# Patient Record
Sex: FEMALE | Race: WHITE | Employment: FULL TIME | URBAN - NONMETROPOLITAN AREA
[De-identification: names, ages, dates, MRNs, and addresses within clinical notes are randomized per-mention and may not be internally consistent; named-entity substitution may affect disease eponyms.]

---

## 2017-10-05 ENCOUNTER — HOSPITAL ENCOUNTER (OUTPATIENT)
Dept: WOMENS IMAGING | Age: 47
Discharge: HOME | End: 2017-10-05
Payer: COMMERCIAL

## 2017-10-05 DIAGNOSIS — Z12.31 VISIT FOR SCREENING MAMMOGRAM: ICD-10-CM

## 2017-10-05 PROCEDURE — 77063 BREAST TOMOSYNTHESIS BI: CPT

## 2017-11-10 ENCOUNTER — HOSPITAL ENCOUNTER (OUTPATIENT)
Dept: GENERAL RADIOLOGY | Age: 47
Discharge: HOME | End: 2017-11-10
Payer: COMMERCIAL

## 2017-11-10 ENCOUNTER — HOSPITAL ENCOUNTER (OUTPATIENT)
Age: 47
Discharge: HOME | End: 2017-11-10
Payer: COMMERCIAL

## 2017-11-10 DIAGNOSIS — M54.5 LOW BACK PAIN, UNSPECIFIED BACK PAIN LATERALITY, UNSPECIFIED CHRONICITY, WITH SCIATICA PRESENCE UNSPECIFIED: ICD-10-CM

## 2017-11-10 PROCEDURE — 72100 X-RAY EXAM L-S SPINE 2/3 VWS: CPT

## 2018-10-18 ENCOUNTER — HOSPITAL ENCOUNTER (OUTPATIENT)
Dept: WOMENS IMAGING | Age: 48
Discharge: HOME | End: 2018-10-18
Payer: COMMERCIAL

## 2018-10-18 DIAGNOSIS — Z12.31 VISIT FOR SCREENING MAMMOGRAM: ICD-10-CM

## 2018-10-18 PROCEDURE — 77063 BREAST TOMOSYNTHESIS BI: CPT

## 2019-11-22 ENCOUNTER — HOSPITAL ENCOUNTER (OUTPATIENT)
Dept: WOMENS IMAGING | Age: 49
Discharge: HOME | End: 2019-11-22
Payer: COMMERCIAL

## 2019-11-22 DIAGNOSIS — Z12.31 VISIT FOR SCREENING MAMMOGRAM: ICD-10-CM

## 2019-11-22 PROCEDURE — 77063 BREAST TOMOSYNTHESIS BI: CPT

## 2020-09-16 ENCOUNTER — HOSPITAL ENCOUNTER (OUTPATIENT)
Age: 50
Discharge: HOME | End: 2020-09-16
Payer: OTHER GOVERNMENT

## 2020-09-16 PROCEDURE — 99211 OFF/OP EST MAY X REQ PHY/QHP: CPT

## 2020-09-16 PROCEDURE — U0003 INFECTIOUS AGENT DETECTION BY NUCLEIC ACID (DNA OR RNA); SEVERE ACUTE RESPIRATORY SYNDROME CORONAVIRUS 2 (SARS-COV-2) (CORONAVIRUS DISEASE [COVID-19]), AMPLIFIED PROBE TECHNIQUE, MAKING USE OF HIGH THROUGHPUT TECHNOLOGIES AS DESCRIBED BY CMS-2020-01-R: HCPCS

## 2020-09-16 NOTE — PROGRESS NOTES
Patient walked to room 1, COVID swab collected and sent to the lab, patient tolerated well and left in stable condition.

## 2020-09-18 LAB — SARS-COV-2: NOT DETECTED

## 2020-12-09 ENCOUNTER — HOSPITAL ENCOUNTER (OUTPATIENT)
Dept: WOMENS IMAGING | Age: 50
Discharge: HOME | End: 2020-12-09
Payer: COMMERCIAL

## 2020-12-09 PROCEDURE — 77063 BREAST TOMOSYNTHESIS BI: CPT

## 2021-12-27 ENCOUNTER — HOSPITAL ENCOUNTER (OUTPATIENT)
Dept: WOMENS IMAGING | Age: 51
Discharge: HOME | End: 2021-12-27
Payer: COMMERCIAL

## 2021-12-27 DIAGNOSIS — Z12.31 VISIT FOR SCREENING MAMMOGRAM: ICD-10-CM

## 2021-12-27 PROCEDURE — 77063 BREAST TOMOSYNTHESIS BI: CPT

## 2023-01-06 ENCOUNTER — HOSPITAL ENCOUNTER (OUTPATIENT)
Dept: WOMENS IMAGING | Age: 53
Discharge: HOME | End: 2023-01-06
Payer: COMMERCIAL

## 2023-01-06 DIAGNOSIS — Z12.31 VISIT FOR SCREENING MAMMOGRAM: ICD-10-CM

## 2023-01-06 PROCEDURE — 77067 SCR MAMMO BI INCL CAD: CPT

## 2023-02-20 ENCOUNTER — TELEPHONE (OUTPATIENT)
Dept: FAMILY MEDICINE CLINIC | Age: 53
End: 2023-02-20

## 2023-02-20 NOTE — TELEPHONE ENCOUNTER
----- Message from Armin Boone sent at 2/20/2023 10:00 AM EST -----  Subject: Appointment Request    Reason for Call: New Patient/New to Provider Appointment needed: New   Patient Request Appointment    QUESTIONS    Reason for appointment request? No appointments available during search     Additional Information for Provider? Pt is looking to establish care with   Dr. Reji Chatterjee. He was recommended by a friend that just started to see   him as well. She will need some refills on her medicines through March   that are for DM. She would be available first thing in the morning or last   appt of the day she is flexible for scheduling.  Please call pt to advise.  ---------------------------------------------------------------------------  --------------  Noman BUSH  [phone]; OK to leave message on voicemail  ---------------------------------------------------------------------------  --------------  SCRIPT ANSWERS  COVID Screen: Irene Landaverde

## 2023-02-20 NOTE — TELEPHONE ENCOUNTER
Spoke with Aicha Tristan, NP appointment scheduled for 3/7/23. Pt to bring 2 years of records to her visit. Aicha Tirstan worked for Dr. Liliya Degroot he was not happy that she left. That office probably won't respond for any additional records.

## 2023-03-07 ENCOUNTER — OFFICE VISIT (OUTPATIENT)
Dept: FAMILY MEDICINE CLINIC | Age: 53
End: 2023-03-07

## 2023-03-07 VITALS
BODY MASS INDEX: 31.81 KG/M2 | HEART RATE: 84 BPM | SYSTOLIC BLOOD PRESSURE: 126 MMHG | OXYGEN SATURATION: 98 % | HEIGHT: 66 IN | WEIGHT: 197.9 LBS | RESPIRATION RATE: 16 BRPM | DIASTOLIC BLOOD PRESSURE: 82 MMHG

## 2023-03-07 DIAGNOSIS — K58.0 IRRITABLE BOWEL SYNDROME WITH DIARRHEA: ICD-10-CM

## 2023-03-07 DIAGNOSIS — E08.9 DIABETES MELLITUS DUE TO UNDERLYING CONDITION, CONTROLLED, WITHOUT COMPLICATION, WITHOUT LONG-TERM CURRENT USE OF INSULIN (HCC): ICD-10-CM

## 2023-03-07 DIAGNOSIS — L72.3 SEBACEOUS CYST: ICD-10-CM

## 2023-03-07 DIAGNOSIS — E66.9 OBESITY (BMI 30.0-34.9): ICD-10-CM

## 2023-03-07 DIAGNOSIS — Z00.00 WELL ADULT HEALTH CHECK: Primary | ICD-10-CM

## 2023-03-07 DIAGNOSIS — E78.00 PURE HYPERCHOLESTEROLEMIA: ICD-10-CM

## 2023-03-07 RX ORDER — ROSUVASTATIN CALCIUM 10 MG/1
TABLET, COATED ORAL
COMMUNITY
Start: 2023-02-27

## 2023-03-07 RX ORDER — METFORMIN HYDROCHLORIDE 500 MG/1
TABLET, EXTENDED RELEASE ORAL
COMMUNITY
Start: 2023-02-27

## 2023-03-07 SDOH — ECONOMIC STABILITY: FOOD INSECURITY: WITHIN THE PAST 12 MONTHS, THE FOOD YOU BOUGHT JUST DIDN'T LAST AND YOU DIDN'T HAVE MONEY TO GET MORE.: NEVER TRUE

## 2023-03-07 SDOH — ECONOMIC STABILITY: FOOD INSECURITY: WITHIN THE PAST 12 MONTHS, YOU WORRIED THAT YOUR FOOD WOULD RUN OUT BEFORE YOU GOT MONEY TO BUY MORE.: NEVER TRUE

## 2023-03-07 SDOH — HEALTH STABILITY: PHYSICAL HEALTH: ON AVERAGE, HOW MANY MINUTES DO YOU ENGAGE IN EXERCISE AT THIS LEVEL?: 10 MIN

## 2023-03-07 SDOH — HEALTH STABILITY: PHYSICAL HEALTH: ON AVERAGE, HOW MANY DAYS PER WEEK DO YOU ENGAGE IN MODERATE TO STRENUOUS EXERCISE (LIKE A BRISK WALK)?: 1 DAY

## 2023-03-07 SDOH — ECONOMIC STABILITY: HOUSING INSECURITY
IN THE LAST 12 MONTHS, WAS THERE A TIME WHEN YOU DID NOT HAVE A STEADY PLACE TO SLEEP OR SLEPT IN A SHELTER (INCLUDING NOW)?: NO

## 2023-03-07 SDOH — ECONOMIC STABILITY: INCOME INSECURITY: HOW HARD IS IT FOR YOU TO PAY FOR THE VERY BASICS LIKE FOOD, HOUSING, MEDICAL CARE, AND HEATING?: NOT HARD AT ALL

## 2023-03-07 ASSESSMENT — PATIENT HEALTH QUESTIONNAIRE - PHQ9
SUM OF ALL RESPONSES TO PHQ9 QUESTIONS 1 & 2: 0
1. LITTLE INTEREST OR PLEASURE IN DOING THINGS: 0
SUM OF ALL RESPONSES TO PHQ QUESTIONS 1-9: 0
SUM OF ALL RESPONSES TO PHQ QUESTIONS 1-9: 0
2. FEELING DOWN, DEPRESSED OR HOPELESS: 0
SUM OF ALL RESPONSES TO PHQ QUESTIONS 1-9: 0
SUM OF ALL RESPONSES TO PHQ QUESTIONS 1-9: 0

## 2023-03-07 ASSESSMENT — SOCIAL DETERMINANTS OF HEALTH (SDOH)
WITHIN THE LAST YEAR, HAVE YOU BEEN AFRAID OF YOUR PARTNER OR EX-PARTNER?: NO
WITHIN THE LAST YEAR, HAVE YOU BEEN KICKED, HIT, SLAPPED, OR OTHERWISE PHYSICALLY HURT BY YOUR PARTNER OR EX-PARTNER?: NO
WITHIN THE LAST YEAR, HAVE YOU BEEN HUMILIATED OR EMOTIONALLY ABUSED IN OTHER WAYS BY YOUR PARTNER OR EX-PARTNER?: NO
WITHIN THE LAST YEAR, HAVE TO BEEN RAPED OR FORCED TO HAVE ANY KIND OF SEXUAL ACTIVITY BY YOUR PARTNER OR EX-PARTNER?: NO

## 2023-03-07 NOTE — PROGRESS NOTES
3/7/2023    Silvana Leon (:  1970) is a 47 y.o. female, here for a preventive medicine evaluation. Chief Complaint   Patient presents with    New Patient     Establish care, previously saw Dr. Duy Ruvalcaba, Medication Refills     NP to establish. Last PCP Dr. Duy Ruvalcaba. Hx of DM2, on Jardiance and metformin. Last A1C 6.3.    BPs fine. BP Readings from Last 3 Encounters:   23 126/82   16 117/68     Wt Readings from Last 3 Encounters:   23 197 lb 14.4 oz (89.8 kg)   16 210 lb 6.4 oz (95.4 kg)     Hx of IBS-D, controlled on Viberzi. Sees Dr. Audrey Read. CRS UTD. Pt has a cyst to upper chest that has been there for years. Getting larger. Would like removed. Patient Active Problem List   Diagnosis    Diabetes mellitus due to underlying condition, controlled, without complication, without long-term current use of insulin (HCC)    Pure hypercholesterolemia    Irritable bowel syndrome with diarrhea    Obesity (BMI 30.0-34. 9)     Past Surgical History:   Procedure Laterality Date    COLONOSCOPY       Social History     Tobacco Use    Smoking status: Never    Smokeless tobacco: Never   Substance Use Topics    Alcohol use: No     Alcohol/week: 0.0 standard drinks    Drug use: No       Review of Systems   Constitutional: Negative. HENT: Negative. Respiratory: Negative. Cardiovascular: Negative. Gastrointestinal: Negative. Musculoskeletal: Negative. Skin:         Cyst to chest   All other systems reviewed and are negative. Prior to Visit Medications    Medication Sig Taking?  Authorizing Provider   Cetirizine HCl 10 MG CAPS Take 1 tablet by mouth daily as needed Yes Historical Provider, MD   metFORMIN (GLUCOPHAGE-XR) 500 MG extended release tablet TAKE 1 TABLET BY MOUTH WITH SUPPER Yes Historical Provider, MD   rosuvastatin (CRESTOR) 10 MG tablet TAKE 1 TABLET BY MOUTH EVERY DAY Yes Historical Provider, MD   empagliflozin (JARDIANCE) 10 MG tablet Take 1 tablet by mouth daily Yes Latonya Hernandez DO   VIBERZI 75 MG TABS Take 75 mg by mouth daily. Yes Historical Provider, MD   Multiple Vitamins-Minerals (WOMENS MULTI VITAMIN & MINERAL PO) Take by mouth daily Yes Historical Provider, MD   acetaminophen (TYLENOL) 325 MG tablet Take 650 mg by mouth every 6 hours as needed for Pain Yes Historical Provider, MD        No Known Allergies    Past Medical History:   Diagnosis Date    Headache     Irritable bowel syndrome        Past Surgical History:   Procedure Laterality Date    COLONOSCOPY         Social History     Socioeconomic History    Marital status:      Spouse name: Not on file    Number of children: Not on file    Years of education: Not on file    Highest education level: Not on file   Occupational History    Not on file   Tobacco Use    Smoking status: Never    Smokeless tobacco: Never   Substance and Sexual Activity    Alcohol use: No     Alcohol/week: 0.0 standard drinks    Drug use: No    Sexual activity: Not on file   Other Topics Concern    Not on file   Social History Narrative    Not on file     Social Determinants of Health     Financial Resource Strain: Low Risk     Difficulty of Paying Living Expenses: Not hard at all   Food Insecurity: No Food Insecurity    Worried About Running Out of Food in the Last Year: Never true    Ran Out of Food in the Last Year: Never true   Transportation Needs: Unknown    Lack of Transportation (Medical): Not on file    Lack of Transportation (Non-Medical):  No   Physical Activity: Insufficiently Active    Days of Exercise per Week: 1 day    Minutes of Exercise per Session: 10 min   Stress: Not on file   Social Connections: Not on file   Intimate Partner Violence: Not At Risk    Fear of Current or Ex-Partner: No    Emotionally Abused: No    Physically Abused: No    Sexually Abused: No   Housing Stability: Unknown    Unable to Pay for Housing in the Last Year: Not on file    Number of Places Lived in the Last Year: Not on file    Unstable Housing in the Last Year: No        Family History   Problem Relation Age of Onset    High Blood Pressure Mother     Diabetes Mother     High Blood Pressure Father     High Cholesterol Father     High Blood Pressure Sister     Cancer Maternal Grandmother         Lung    High Blood Pressure Maternal Grandmother     Stroke Maternal Grandfather     High Blood Pressure Maternal Grandfather     Heart Disease Paternal Grandmother     High Blood Pressure Paternal Grandmother     Heart Disease Paternal Grandfather     High Blood Pressure Paternal Grandfather     High Blood Pressure Sister        ADVANCE DIRECTIVE: N, <no information>    Vitals:    03/07/23 1515   BP: 126/82   Site: Left Upper Arm   Position: Sitting   Cuff Size: Large Adult   Pulse: 84   Resp: 16   SpO2: 98%  Comment: Room Air   Weight: 197 lb 14.4 oz (89.8 kg)   Height: 5' 5.95\" (1.675 m)     Estimated body mass index is 32 kg/m² as calculated from the following:    Height as of this encounter: 5' 5.95\" (1.675 m). Weight as of this encounter: 197 lb 14.4 oz (89.8 kg). Physical Exam  Vitals and nursing note reviewed. Constitutional:       General: She is not in acute distress. Appearance: Normal appearance. She is well-developed. HENT:      Head: Normocephalic and atraumatic. Right Ear: Tympanic membrane normal.      Left Ear: Tympanic membrane normal.   Eyes:      Conjunctiva/sclera: Conjunctivae normal.   Cardiovascular:      Rate and Rhythm: Normal rate and regular rhythm. Heart sounds: Normal heart sounds. No murmur heard. Pulmonary:      Effort: Pulmonary effort is normal.      Breath sounds: Normal breath sounds. No wheezing, rhonchi or rales. Chest:       Abdominal:      General: There is no distension. Musculoskeletal:      Cervical back: Neck supple. Skin:     General: Skin is warm and dry. Findings: No rash (on exposed surfaces). Neurological:      General: No focal deficit present. Mental Status: She is alert. Psychiatric:         Attention and Perception: Attention normal.         Mood and Affect: Mood normal.         Speech: Speech normal.         Behavior: Behavior normal. Behavior is cooperative. Thought Content: Thought content normal.         Judgment: Judgment normal.       No flowsheet data found. No results found for: CHOL, CHOLFAST, TRIG, TRIGLYCFAST, HDL, LDLCHOLESTEROL, LDLCALC, GLUF, GLUCOSE, LABA1C    The ASCVD Risk score (Delfina DK, et al., 2019) failed to calculate for the following reasons:    Cannot find a previous HDL lab    Cannot find a previous total cholesterol lab      There is no immunization history on file for this patient. Health Maintenance   Topic Date Due    Pneumococcal 0-64 years Vaccine (1 - PCV) Never done    HIV screen  Never done    Hepatitis C screen  Never done    DTaP/Tdap/Td vaccine (1 - Tdap) Never done    Diabetes screen  Never done    COVID-19 Vaccine (2 - Booster for Santiago series) 05/07/2021    Shingles vaccine (1 of 2) Never done    Flu vaccine (1) 08/01/2022    Lipids  07/27/2023    Depression Screen  03/07/2024    Cervical cancer screen  12/22/2024    Breast cancer screen  01/06/2025    Colorectal Cancer Screen  01/13/2026    Hepatitis A vaccine  Aged Out    Hib vaccine  Aged Out    Meningococcal (ACWY) vaccine  Aged Out       Assessment & Plan   Well adult health check  -     Lipid Panel w/ Reflex Direct LDL; Future  -     Comprehensive Metabolic Panel; Future  -     Hemoglobin A1C; Future  -     Microalbumin / Creatinine Urine Ratio; Future  -     TSH with Reflex; Future  -     CBC with Auto Differential; Future  Diabetes mellitus due to underlying condition, controlled, without complication, without long-term current use of insulin (HCC)  -     empagliflozin (JARDIANCE) 10 MG tablet;  Take 1 tablet by mouth daily, Disp-90 tablet, R-3Normal  Pure hypercholesterolemia  Sebaceous cyst  Nasreen Parker MD, Plastic Surgery, [address]  Irritable bowel syndrome with diarrhea  Obesity (BMI 30.0-34.9)    -  PMHx reviewed  -  Chronic medical problems stable  -  Continue current medications  -  Refer to Plastic Sx for cyst removal  -  Recheck labs 6 mos    Return in about 6 months (around 9/7/2023) for DM2.         --Angela Tellez, DO

## 2023-03-08 PROBLEM — E78.00 PURE HYPERCHOLESTEROLEMIA: Status: ACTIVE | Noted: 2023-03-08

## 2023-03-08 PROBLEM — E66.811 OBESITY (BMI 30.0-34.9): Status: ACTIVE | Noted: 2023-03-08

## 2023-03-08 PROBLEM — K58.0 IRRITABLE BOWEL SYNDROME WITH DIARRHEA: Status: ACTIVE | Noted: 2023-03-08

## 2023-03-08 PROBLEM — E08.9 DIABETES MELLITUS DUE TO UNDERLYING CONDITION, CONTROLLED, WITHOUT COMPLICATION, WITHOUT LONG-TERM CURRENT USE OF INSULIN (HCC): Status: ACTIVE | Noted: 2023-03-08

## 2023-03-08 PROBLEM — E66.9 OBESITY (BMI 30.0-34.9): Status: ACTIVE | Noted: 2023-03-08

## 2023-03-08 ASSESSMENT — ENCOUNTER SYMPTOMS
GASTROINTESTINAL NEGATIVE: 1
RESPIRATORY NEGATIVE: 1

## 2023-03-27 DIAGNOSIS — E08.9 DIABETES MELLITUS DUE TO UNDERLYING CONDITION, CONTROLLED, WITHOUT COMPLICATION, WITHOUT LONG-TERM CURRENT USE OF INSULIN (HCC): ICD-10-CM

## 2023-03-27 NOTE — TELEPHONE ENCOUNTER
Fax received from [email or web address] stating that the patient has requested her Jardiance be refilled through them. Order pended for your signature.

## 2023-05-18 DIAGNOSIS — K58.0 IRRITABLE BOWEL SYNDROME WITH DIARRHEA: Primary | ICD-10-CM

## 2023-05-18 RX ORDER — ROSUVASTATIN CALCIUM 10 MG/1
10 TABLET, COATED ORAL DAILY
Qty: 90 TABLET | Refills: 3 | Status: SHIPPED | OUTPATIENT
Start: 2023-05-18

## 2023-05-18 RX ORDER — METFORMIN HYDROCHLORIDE 500 MG/1
TABLET, EXTENDED RELEASE ORAL
Qty: 90 TABLET | Refills: 3 | Status: SHIPPED | OUTPATIENT
Start: 2023-05-18

## 2023-05-18 RX ORDER — ELUXADOLINE 75 MG/1
75 TABLET, FILM COATED ORAL DAILY
Qty: 90 TABLET | Refills: 1 | Status: SHIPPED | OUTPATIENT
Start: 2023-05-18 | End: 2023-11-14

## 2023-07-25 ENCOUNTER — TELEPHONE (OUTPATIENT)
Dept: FAMILY MEDICINE CLINIC | Age: 53
End: 2023-07-25

## 2023-07-25 NOTE — TELEPHONE ENCOUNTER
Patient called and stated that she has on and off chest pain at night. She stated that her first \"episode\" was 4 months ago. She stated that she has had about 3 total. She stated that she will wake up in the night with chest pain, heart racing for about 10 mins and then it goes away. She also mention in the last week she has had right shoulder pain. She stated that she doesn't think it has anything to do with the chest pain or anything it feels like a pulled muscle. She stated that she gets pain in the shoulder and then it does down the arm and causes some numbness. She stated the shoulder pain is pretty constant, it doesn't come and go.      Patient wanted to know if we would order testing for the \"episodes\"

## 2023-08-01 ENCOUNTER — OFFICE VISIT (OUTPATIENT)
Dept: FAMILY MEDICINE CLINIC | Age: 53
End: 2023-08-01
Payer: COMMERCIAL

## 2023-08-01 VITALS
RESPIRATION RATE: 16 BRPM | HEART RATE: 76 BPM | BODY MASS INDEX: 31.45 KG/M2 | DIASTOLIC BLOOD PRESSURE: 80 MMHG | SYSTOLIC BLOOD PRESSURE: 134 MMHG | WEIGHT: 194.5 LBS

## 2023-08-01 DIAGNOSIS — E08.9 DIABETES MELLITUS DUE TO UNDERLYING CONDITION, CONTROLLED, WITHOUT COMPLICATION, WITHOUT LONG-TERM CURRENT USE OF INSULIN (HCC): ICD-10-CM

## 2023-08-01 DIAGNOSIS — R06.09 DOE (DYSPNEA ON EXERTION): ICD-10-CM

## 2023-08-01 DIAGNOSIS — R07.9 CHEST PAIN, UNSPECIFIED TYPE: Primary | ICD-10-CM

## 2023-08-01 DIAGNOSIS — E78.00 PURE HYPERCHOLESTEROLEMIA: ICD-10-CM

## 2023-08-01 DIAGNOSIS — E66.9 OBESITY (BMI 30.0-34.9): ICD-10-CM

## 2023-08-01 DIAGNOSIS — Z82.49 FAMILY HISTORY OF EARLY CAD: ICD-10-CM

## 2023-08-01 PROCEDURE — 99214 OFFICE O/P EST MOD 30 MIN: CPT | Performed by: FAMILY MEDICINE

## 2023-08-01 ASSESSMENT — PATIENT HEALTH QUESTIONNAIRE - PHQ9
SUM OF ALL RESPONSES TO PHQ QUESTIONS 1-9: 0
1. LITTLE INTEREST OR PLEASURE IN DOING THINGS: 0
SUM OF ALL RESPONSES TO PHQ QUESTIONS 1-9: 0
SUM OF ALL RESPONSES TO PHQ9 QUESTIONS 1 & 2: 0
2. FEELING DOWN, DEPRESSED OR HOPELESS: 0
SUM OF ALL RESPONSES TO PHQ QUESTIONS 1-9: 0
SUM OF ALL RESPONSES TO PHQ QUESTIONS 1-9: 0

## 2023-08-01 ASSESSMENT — ENCOUNTER SYMPTOMS
SHORTNESS OF BREATH: 1
GASTROINTESTINAL NEGATIVE: 1
CHEST TIGHTNESS: 1

## 2023-08-01 NOTE — PROGRESS NOTES
daily      acetaminophen (TYLENOL) 325 MG tablet Take 2 tablets by mouth every 6 hours as needed for Pain       No current facility-administered medications for this visit. Past Surgical History:   Procedure Laterality Date    COLONOSCOPY         Review of Systems   Constitutional: Negative. HENT: Negative. Respiratory:  Positive for chest tightness and shortness of breath. Cardiovascular:  Positive for chest pain and palpitations. Gastrointestinal: Negative. Musculoskeletal:  Positive for arthralgias (right shoulder pain). Psychiatric/Behavioral:  Positive for sleep disturbance. The patient is nervous/anxious. All other systems reviewed and are negative. Objective   Physical Exam  Vitals and nursing note reviewed. Constitutional:       General: She is not in acute distress. Appearance: Normal appearance. She is well-developed. HENT:      Head: Normocephalic and atraumatic. Right Ear: Tympanic membrane normal.      Left Ear: Tympanic membrane normal.   Eyes:      Conjunctiva/sclera: Conjunctivae normal.   Cardiovascular:      Rate and Rhythm: Normal rate and regular rhythm. Heart sounds: Normal heart sounds. No murmur heard. Pulmonary:      Effort: Pulmonary effort is normal.      Breath sounds: Normal breath sounds. No wheezing, rhonchi or rales. Abdominal:      General: There is no distension. Musculoskeletal:      Cervical back: Neck supple. Skin:     General: Skin is warm and dry. Findings: No rash (on exposed surfaces). Neurological:      General: No focal deficit present. Mental Status: She is alert. Psychiatric:         Attention and Perception: Attention normal.         Mood and Affect: Mood normal.         Speech: Speech normal.         Behavior: Behavior normal. Behavior is cooperative. Thought Content: Thought content normal.         Judgment: Judgment normal.             ASSESSMENT/PLAN:  1.  Chest pain, unspecified type  -

## 2023-08-08 ENCOUNTER — HOSPITAL ENCOUNTER (OUTPATIENT)
Dept: NON INVASIVE DIAGNOSTICS | Age: 53
Discharge: HOME | End: 2023-08-08
Attending: FAMILY MEDICINE
Payer: COMMERCIAL

## 2023-08-08 DIAGNOSIS — R07.9 CHEST PAIN, UNSPECIFIED TYPE: ICD-10-CM

## 2023-08-08 DIAGNOSIS — R06.09 DOE (DYSPNEA ON EXERTION): ICD-10-CM

## 2023-08-08 LAB
LV EF: 60 %
LVEF MODALITY: NORMAL

## 2023-08-08 PROCEDURE — 93306 TTE W/DOPPLER COMPLETE: CPT

## 2023-11-27 DIAGNOSIS — K58.0 IRRITABLE BOWEL SYNDROME WITH DIARRHEA: ICD-10-CM

## 2023-11-27 RX ORDER — ELUXADOLINE 75 MG/1
75 TABLET, FILM COATED ORAL DAILY
Qty: 90 TABLET | Refills: 0 | Status: SHIPPED | OUTPATIENT
Start: 2023-11-27 | End: 2024-05-25

## 2024-01-10 ENCOUNTER — OFFICE VISIT (OUTPATIENT)
Dept: FAMILY MEDICINE CLINIC | Age: 54
End: 2024-01-10
Payer: COMMERCIAL

## 2024-01-10 VITALS
WEIGHT: 195.7 LBS | HEART RATE: 72 BPM | SYSTOLIC BLOOD PRESSURE: 124 MMHG | BODY MASS INDEX: 31.45 KG/M2 | HEIGHT: 66 IN | RESPIRATION RATE: 16 BRPM | DIASTOLIC BLOOD PRESSURE: 70 MMHG

## 2024-01-10 DIAGNOSIS — E08.9 DIABETES MELLITUS DUE TO UNDERLYING CONDITION, CONTROLLED, WITHOUT COMPLICATION, WITHOUT LONG-TERM CURRENT USE OF INSULIN (HCC): ICD-10-CM

## 2024-01-10 DIAGNOSIS — G47.00 INSOMNIA, UNSPECIFIED TYPE: ICD-10-CM

## 2024-01-10 DIAGNOSIS — E66.9 OBESITY (BMI 30.0-34.9): ICD-10-CM

## 2024-01-10 DIAGNOSIS — Z00.00 WELL ADULT HEALTH CHECK: Primary | ICD-10-CM

## 2024-01-10 DIAGNOSIS — K58.0 IRRITABLE BOWEL SYNDROME WITH DIARRHEA: ICD-10-CM

## 2024-01-10 DIAGNOSIS — E78.00 PURE HYPERCHOLESTEROLEMIA: ICD-10-CM

## 2024-01-10 PROCEDURE — 99396 PREV VISIT EST AGE 40-64: CPT | Performed by: FAMILY MEDICINE

## 2024-01-10 RX ORDER — ZOLPIDEM TARTRATE 6.25 MG/1
6.25 TABLET, FILM COATED, EXTENDED RELEASE ORAL NIGHTLY PRN
Qty: 30 TABLET | Refills: 2 | Status: SHIPPED | OUTPATIENT
Start: 2024-01-10 | End: 2024-04-09

## 2024-01-10 ASSESSMENT — PATIENT HEALTH QUESTIONNAIRE - PHQ9
1. LITTLE INTEREST OR PLEASURE IN DOING THINGS: 0
2. FEELING DOWN, DEPRESSED OR HOPELESS: 0
SUM OF ALL RESPONSES TO PHQ9 QUESTIONS 1 & 2: 0
SUM OF ALL RESPONSES TO PHQ QUESTIONS 1-9: 0

## 2024-01-10 NOTE — PROGRESS NOTES
Affect: Mood normal.         Speech: Speech normal.         Behavior: Behavior normal. Behavior is cooperative.         Thought Content: Thought content normal.         Judgment: Judgment normal.             Latest Ref Rng & Units 10/7/2023     6:30 AM 3/23/2022     1:05 PM 3/23/2022    10:35 AM   LAB PR[facility]   A1C 4.4 - 6.4 % 6.4      A1C POC 4.4 - 6.4 % 6.4      GLU random 70 - 110 mg/dL 132      GLU POC 70 - 126 mg/dL  129  170    CHOL <200 mg/dL 133      TRIG <150 mg/dL 207      HDL mg/dL 53      LDL CALC mg/dL 39       - 145 mEq/L 141      K 3.6 - 5.0 mEq/L 4.0      BUN 7 - 20 mg/dL 15      CR 0.60 - 1.30 mg/dL 0.65      CA 8.8 - 10.5 mg/dL 8.70      ALT 10 - 40 IU/L 18      AST 15 - 41 IU/L 14      TSH 0.490 - 4.670 mcIU/mL 3.837      HGB 12.0 - 15.0 gm/dL 14.9          Lab Results   Component Value Date/Time    CHOL 133 10/07/2023 06:30 AM    TRIG 207 10/07/2023 06:30 AM    HDL 53 10/07/2023 06:30 AM    LDLCALC 39 10/07/2023 06:30 AM    GLUCOSE 132 10/07/2023 06:30 AM    LABA1C 6.4 10/07/2023 06:30 AM       The 10-year ASCVD risk score (Delfina DASILVA, et al., 2019) is: 1.7%    Values used to calculate the score:      Age: 53 years      Sex: Female      Is Non- : No      Diabetic: Yes      Tobacco smoker: No      Systolic Blood Pressure: 124 mmHg      Is BP treated: No      HDL Cholesterol: 53 mg/dL      Total Cholesterol: 133 mg/dL      There is no immunization history on file for this patient.    Health Maintenance   Topic Date Due    Hepatitis B vaccine (1 of 3 - 3-dose series) Never done    COVID-19 Vaccine (1) Never done    Pneumococcal 0-64 years Vaccine (1 - PCV) Never done    Diabetic foot exam  Never done    HIV screen  Never done    Diabetic Alb to Cr ratio (uACR) test  Never done    Diabetic retinal exam  Never done    GFR test (Diabetes, CKD 3-4, OR last GFR 15-59)  Never done    DTaP/Tdap/Td vaccine (1 - Tdap) Never done    Shingles vaccine (1 of 2) Never done    Flu

## 2024-01-11 ASSESSMENT — ENCOUNTER SYMPTOMS
GASTROINTESTINAL NEGATIVE: 1
RESPIRATORY NEGATIVE: 1

## 2024-02-01 DIAGNOSIS — E08.9 DIABETES MELLITUS DUE TO UNDERLYING CONDITION, CONTROLLED, WITHOUT COMPLICATION, WITHOUT LONG-TERM CURRENT USE OF INSULIN (HCC): ICD-10-CM

## 2024-02-01 RX ORDER — EMPAGLIFLOZIN 10 MG/1
10 TABLET, FILM COATED ORAL DAILY
Qty: 90 TABLET | Refills: 3 | Status: SHIPPED | OUTPATIENT
Start: 2024-02-01

## 2024-02-06 ENCOUNTER — HOSPITAL ENCOUNTER (OUTPATIENT)
Dept: WOMENS IMAGING | Age: 54
Discharge: HOME | End: 2024-02-06
Payer: COMMERCIAL

## 2024-02-06 DIAGNOSIS — Z12.31 VISIT FOR SCREENING MAMMOGRAM: ICD-10-CM

## 2024-02-06 PROCEDURE — 77063 BREAST TOMOSYNTHESIS BI: CPT

## 2024-03-10 DIAGNOSIS — K58.0 IRRITABLE BOWEL SYNDROME WITH DIARRHEA: ICD-10-CM

## 2024-03-11 RX ORDER — ELUXADOLINE 75 MG/1
75 TABLET, FILM COATED ORAL DAILY
Qty: 90 TABLET | Refills: 0 | Status: SHIPPED | OUTPATIENT
Start: 2024-03-11 | End: 2024-09-07

## 2024-03-20 DIAGNOSIS — E08.9 DIABETES MELLITUS DUE TO UNDERLYING CONDITION, CONTROLLED, WITHOUT COMPLICATION, WITHOUT LONG-TERM CURRENT USE OF INSULIN (HCC): ICD-10-CM

## 2024-04-30 DIAGNOSIS — G47.00 INSOMNIA, UNSPECIFIED TYPE: ICD-10-CM

## 2024-04-30 RX ORDER — ZOLPIDEM TARTRATE 6.25 MG/1
6.25 TABLET, FILM COATED, EXTENDED RELEASE ORAL NIGHTLY PRN
Qty: 30 TABLET | Refills: 2 | Status: SHIPPED | OUTPATIENT
Start: 2024-04-30 | End: 2024-07-29

## 2024-05-28 RX ORDER — METFORMIN HYDROCHLORIDE 500 MG/1
TABLET, EXTENDED RELEASE ORAL
Qty: 90 TABLET | Refills: 3 | Status: SHIPPED | OUTPATIENT
Start: 2024-05-28

## 2024-05-28 RX ORDER — ROSUVASTATIN CALCIUM 10 MG/1
10 TABLET, COATED ORAL DAILY
Qty: 90 TABLET | Refills: 3 | Status: SHIPPED | OUTPATIENT
Start: 2024-05-28

## 2024-05-30 ENCOUNTER — OFFICE VISIT (OUTPATIENT)
Dept: FAMILY MEDICINE CLINIC | Age: 54
End: 2024-05-30
Payer: COMMERCIAL

## 2024-05-30 VITALS
WEIGHT: 192.8 LBS | RESPIRATION RATE: 16 BRPM | HEIGHT: 66 IN | HEART RATE: 80 BPM | BODY MASS INDEX: 30.98 KG/M2 | DIASTOLIC BLOOD PRESSURE: 70 MMHG | SYSTOLIC BLOOD PRESSURE: 122 MMHG

## 2024-05-30 DIAGNOSIS — R05.1 ACUTE COUGH: ICD-10-CM

## 2024-05-30 DIAGNOSIS — J32.9 SINOBRONCHITIS: Primary | ICD-10-CM

## 2024-05-30 DIAGNOSIS — J40 SINOBRONCHITIS: Primary | ICD-10-CM

## 2024-05-30 DIAGNOSIS — E08.9 DIABETES MELLITUS DUE TO UNDERLYING CONDITION, CONTROLLED, WITHOUT COMPLICATION, WITHOUT LONG-TERM CURRENT USE OF INSULIN (HCC): ICD-10-CM

## 2024-05-30 PROCEDURE — G2211 COMPLEX E/M VISIT ADD ON: HCPCS | Performed by: NURSE PRACTITIONER

## 2024-05-30 PROCEDURE — 99213 OFFICE O/P EST LOW 20 MIN: CPT | Performed by: NURSE PRACTITIONER

## 2024-05-30 RX ORDER — PREDNISONE 20 MG/1
20 TABLET ORAL 2 TIMES DAILY
Qty: 4 TABLET | Refills: 0 | Status: SHIPPED | OUTPATIENT
Start: 2024-05-30 | End: 2024-06-03

## 2024-05-30 RX ORDER — AMOXICILLIN AND CLAVULANATE POTASSIUM 875; 125 MG/1; MG/1
1 TABLET, FILM COATED ORAL 2 TIMES DAILY
Qty: 14 TABLET | Refills: 0 | Status: SHIPPED | OUTPATIENT
Start: 2024-05-30 | End: 2024-06-06

## 2024-05-30 SDOH — ECONOMIC STABILITY: FOOD INSECURITY: WITHIN THE PAST 12 MONTHS, YOU WORRIED THAT YOUR FOOD WOULD RUN OUT BEFORE YOU GOT MONEY TO BUY MORE.: NEVER TRUE

## 2024-05-30 SDOH — ECONOMIC STABILITY: FOOD INSECURITY: WITHIN THE PAST 12 MONTHS, THE FOOD YOU BOUGHT JUST DIDN'T LAST AND YOU DIDN'T HAVE MONEY TO GET MORE.: NEVER TRUE

## 2024-05-30 SDOH — ECONOMIC STABILITY: INCOME INSECURITY: HOW HARD IS IT FOR YOU TO PAY FOR THE VERY BASICS LIKE FOOD, HOUSING, MEDICAL CARE, AND HEATING?: NOT HARD AT ALL

## 2024-05-30 ASSESSMENT — ENCOUNTER SYMPTOMS
SINUS PAIN: 1
SHORTNESS OF BREATH: 0
WHEEZING: 1
ABDOMINAL PAIN: 0
COUGH: 1
SORE THROAT: 0
NAUSEA: 0
RHINORRHEA: 1
SINUS PRESSURE: 1

## 2024-05-30 NOTE — PROGRESS NOTES
Selina YOUSIF Leon (1970) 53 y.o. female here for evaluation of the following chief complaint(s):      HPI:  Chief Complaint   Patient presents with    Cough     X 1 week     Facial Pain     Sinus pressure     Headache       Onset of 1 week with sinus pressure, congestion and cough.   Eyes watering and matted.  Sinus HA.   PND.   Nasal blockage at night.   Cough with wheezing.  Denies SOB.      has similar symptoms    OTC: Vicks Severe Cold/FLU, Tussion DM    Does have underlying allergies    Vitals:    05/30/24 0849   BP: 122/70   Pulse: 80   Resp: 16       Hemoglobin A1C   Date Value Ref Range Status   10/07/2023 6.4 4.4 - 6.4 % Final       Patient Active Problem List   Diagnosis    Diabetes mellitus due to underlying condition, controlled, without complication, without long-term current use of insulin (HCC)    Pure hypercholesterolemia    Irritable bowel syndrome with diarrhea    Obesity (BMI 30.0-34.9)       SUBJECTIVE/OBJECTIVE:  Review of Systems   Constitutional:  Positive for activity change, appetite change and fatigue. Negative for chills and fever.   HENT:  Positive for congestion, postnasal drip, rhinorrhea, sinus pressure and sinus pain. Negative for sore throat.    Respiratory:  Positive for cough and wheezing. Negative for shortness of breath.    Cardiovascular:  Negative for chest pain.   Gastrointestinal:  Negative for abdominal pain and nausea.   Skin:  Negative for rash.   Neurological:  Positive for headaches. Negative for dizziness and light-headedness.   Psychiatric/Behavioral: Negative.         Physical Exam  Constitutional:       General: She is not in acute distress.  HENT:      Nose: Mucosal edema, congestion and rhinorrhea present.      Right Sinus: Maxillary sinus tenderness present.      Left Sinus: Maxillary sinus tenderness present.   Eyes:      Pupils: Pupils are equal, round, and reactive to light.   Cardiovascular:      Rate and Rhythm: Normal rate and regular rhythm.

## 2024-06-13 ENCOUNTER — OFFICE VISIT (OUTPATIENT)
Dept: FAMILY MEDICINE CLINIC | Age: 54
End: 2024-06-13

## 2024-06-13 VITALS
WEIGHT: 192.3 LBS | SYSTOLIC BLOOD PRESSURE: 114 MMHG | HEIGHT: 66 IN | RESPIRATION RATE: 16 BRPM | DIASTOLIC BLOOD PRESSURE: 70 MMHG | BODY MASS INDEX: 30.91 KG/M2 | HEART RATE: 88 BPM

## 2024-06-13 DIAGNOSIS — R53.83 OTHER FATIGUE: ICD-10-CM

## 2024-06-13 DIAGNOSIS — R73.01 IFG (IMPAIRED FASTING GLUCOSE): ICD-10-CM

## 2024-06-13 DIAGNOSIS — K58.0 IRRITABLE BOWEL SYNDROME WITH DIARRHEA: ICD-10-CM

## 2024-06-13 DIAGNOSIS — N76.0 ACUTE VAGINITIS: Primary | ICD-10-CM

## 2024-06-13 RX ORDER — FLUCONAZOLE 150 MG/1
150 TABLET ORAL ONCE
Qty: 2 TABLET | Refills: 0 | Status: SHIPPED | OUTPATIENT
Start: 2024-06-13 | End: 2024-06-13

## 2024-06-13 ASSESSMENT — ENCOUNTER SYMPTOMS
ABDOMINAL PAIN: 0
COUGH: 0
NAUSEA: 1
ABDOMINAL DISTENTION: 1
DIARRHEA: 0
SHORTNESS OF BREATH: 0

## 2024-06-13 NOTE — PROGRESS NOTES
sounds: Normal breath sounds. No wheezing.   Abdominal:      General: Bowel sounds are normal. There is no distension.      Palpations: Abdomen is soft.      Tenderness: There is no abdominal tenderness.   Musculoskeletal:         General: No tenderness. Normal range of motion.      Cervical back: Normal range of motion and neck supple.   Skin:     General: Skin is warm and dry.      Findings: No rash.           ASSESSMENT/PLAN:   Diagnosis Orders   1. Acute vaginitis  fluconazole (DIFLUCAN) 150 MG tablet      2. IFG (impaired fasting glucose)        3. Irritable bowel syndrome with diarrhea        4. Other fatigue              MDM:  Diflucan x 2 doses  OTC Probiotic  Rest and Push Fluids  Reassurance on exam  RTO PRN    An electronic signature was used to authenticate this note.    --Ranjit Cummings, RAJI - CNP

## 2024-06-20 DIAGNOSIS — K58.0 IRRITABLE BOWEL SYNDROME WITH DIARRHEA: ICD-10-CM

## 2024-06-21 RX ORDER — ELUXADOLINE 75 MG/1
TABLET, FILM COATED ORAL
Qty: 90 TABLET | Refills: 1 | Status: SHIPPED | OUTPATIENT
Start: 2024-06-21 | End: 2024-12-18

## 2024-08-08 ENCOUNTER — PATIENT MESSAGE (OUTPATIENT)
Dept: FAMILY MEDICINE CLINIC | Age: 54
End: 2024-08-08

## 2024-08-08 DIAGNOSIS — G47.00 INSOMNIA, UNSPECIFIED TYPE: ICD-10-CM

## 2024-08-09 RX ORDER — ZOLPIDEM TARTRATE 6.25 MG/1
6.25 TABLET, FILM COATED, EXTENDED RELEASE ORAL NIGHTLY PRN
Qty: 30 TABLET | Refills: 2 | Status: SHIPPED | OUTPATIENT
Start: 2024-08-09 | End: 2024-11-07

## 2024-08-09 NOTE — TELEPHONE ENCOUNTER
From: Selina Leon  To: Dr. Merrill Larsen  Sent: 8/8/2024 11:20 PM EDT  Subject: Refill    I am in need of rx refill for my zolpidem please

## 2024-10-10 ENCOUNTER — OFFICE VISIT (OUTPATIENT)
Dept: FAMILY MEDICINE CLINIC | Age: 54
End: 2024-10-10

## 2024-10-10 VITALS
BODY MASS INDEX: 30.37 KG/M2 | DIASTOLIC BLOOD PRESSURE: 62 MMHG | SYSTOLIC BLOOD PRESSURE: 110 MMHG | TEMPERATURE: 98 F | RESPIRATION RATE: 12 BRPM | OXYGEN SATURATION: 98 % | WEIGHT: 189 LBS | HEART RATE: 61 BPM | HEIGHT: 66 IN

## 2024-10-10 DIAGNOSIS — E66.811 OBESITY (BMI 30.0-34.9): ICD-10-CM

## 2024-10-10 DIAGNOSIS — J30.2 SEASONAL ALLERGIC RHINITIS, UNSPECIFIED TRIGGER: ICD-10-CM

## 2024-10-10 DIAGNOSIS — E78.00 PURE HYPERCHOLESTEROLEMIA: ICD-10-CM

## 2024-10-10 DIAGNOSIS — D58.2 ELEVATED HEMOGLOBIN (HCC): ICD-10-CM

## 2024-10-10 DIAGNOSIS — K58.0 IRRITABLE BOWEL SYNDROME WITH DIARRHEA: ICD-10-CM

## 2024-10-10 DIAGNOSIS — E11.9 CONTROLLED TYPE 2 DIABETES MELLITUS WITHOUT COMPLICATION, WITHOUT LONG-TERM CURRENT USE OF INSULIN (HCC): Primary | ICD-10-CM

## 2024-10-10 RX ORDER — TIRZEPATIDE 2.5 MG/.5ML
2.5 INJECTION, SOLUTION SUBCUTANEOUS WEEKLY
Qty: 2 ML | Refills: 0 | Status: SHIPPED | OUTPATIENT
Start: 2024-10-10

## 2024-10-10 ASSESSMENT — ENCOUNTER SYMPTOMS
RHINORRHEA: 1
RESPIRATORY NEGATIVE: 1
EYE ITCHING: 1
GASTROINTESTINAL NEGATIVE: 1

## 2024-10-10 NOTE — PROGRESS NOTES
Selina Leon (:  1970) is a 53 y.o. female,Established patient, here for evaluation of the following chief complaint(s):  Labs Only (Discuss lab results, possible medication changes) and Sinusitis (Possible seasonal allergies)          Subjective   SUBJECTIVE/OBJECTIVE:  HPI  Chief Complaint   Patient presents with    Labs Only     Discuss lab results, possible medication changes    Sinusitis     Possible seasonal allergies     6 month eval.    Pt has been fighting some sinus pressure, congestion, cough, watery eyes for the last several weeks.  On Zyrtec.  Tried Nasacort but it gave her HAs.    A1C is up a little.  Lab Results   Component Value Date    LABA1C 6.8 (H) 10/05/2024    LABA1C 6.4 10/07/2023     Lab Results   Component Value Date    CREATININE 0.72 10/05/2024     BP Readings from Last 3 Encounters:   10/10/24 110/62   24 114/70   24 122/70     Weight is down.  Wt Readings from Last 3 Encounters:   10/10/24 85.7 kg (189 lb)   24 87.2 kg (192 lb 4.8 oz)   24 87.5 kg (192 lb 12.8 oz)       Patient Active Problem List   Diagnosis    Diabetes mellitus due to underlying condition, controlled, without complication, without long-term current use of insulin ([facility])    Pure hypercholesterolemia    Irritable bowel syndrome with diarrhea    Obesity (BMI 30.0-34.9)       Current Outpatient Medications   Medication Sig Dispense Refill    Tirzepatide (MOUNJARO) 2.5 MG/0.5ML SOPN SC injection Inject 0.5 mLs into the skin once a week 2 mL 0    zolpidem (AMBIEN CR) 6.25 MG extended release tablet Take 1 tablet by mouth nightly as needed for Sleep for up to 90 days. Max Daily Amount: 6.25 mg 30 tablet 2    VIBERZI 75 MG TABS TAKE 1 TABLET BY MOUTH DAILY - MAX DAILY AMOUNT: 75 MG 90 tablet 1    rosuvastatin (CRESTOR) 10 MG tablet TAKE 1 TABLET BY MOUTH EVERY DAY 90 tablet 3    metFORMIN (GLUCOPHAGE-XR) 500 MG extended release tablet TAKE 1 TABLET BY MOUTH WITH SUPPER 90 tablet 3     No

## 2024-11-06 ENCOUNTER — LAB (OUTPATIENT)
Dept: LAB | Age: 54
End: 2024-11-06

## 2024-11-12 DIAGNOSIS — E11.9 CONTROLLED TYPE 2 DIABETES MELLITUS WITHOUT COMPLICATION, WITHOUT LONG-TERM CURRENT USE OF INSULIN (HCC): ICD-10-CM

## 2024-11-12 RX ORDER — TIRZEPATIDE 5 MG/.5ML
5 INJECTION, SOLUTION SUBCUTANEOUS WEEKLY
Qty: 2 ML | Refills: 0 | Status: SHIPPED | OUTPATIENT
Start: 2024-11-12

## 2024-11-18 LAB — CYTOLOGY THIN PREP PAP: NORMAL

## 2024-11-25 ENCOUNTER — PATIENT MESSAGE (OUTPATIENT)
Dept: FAMILY MEDICINE CLINIC | Age: 54
End: 2024-11-25

## 2024-11-25 DIAGNOSIS — G47.00 INSOMNIA, UNSPECIFIED TYPE: Primary | ICD-10-CM

## 2024-11-25 RX ORDER — ZOLPIDEM TARTRATE 6.25 MG/1
6.25 TABLET, FILM COATED, EXTENDED RELEASE ORAL NIGHTLY PRN
Qty: 90 TABLET | Refills: 1 | Status: SHIPPED | OUTPATIENT
Start: 2024-11-25 | End: 2025-05-24

## 2024-11-25 RX ORDER — ZOLPIDEM TARTRATE 6.25 MG/1
6.25 TABLET, FILM COATED, EXTENDED RELEASE ORAL NIGHTLY PRN
COMMUNITY
End: 2024-11-25 | Stop reason: SDUPTHER

## 2024-12-12 DIAGNOSIS — E11.9 CONTROLLED TYPE 2 DIABETES MELLITUS WITHOUT COMPLICATION, WITHOUT LONG-TERM CURRENT USE OF INSULIN (HCC): ICD-10-CM

## 2024-12-13 LAB
ESTIMATED AVERAGE GLUCOSE: NORMAL
HBA1C MFR BLD: 6.2 %

## 2024-12-13 RX ORDER — TIRZEPATIDE 5 MG/.5ML
INJECTION, SOLUTION SUBCUTANEOUS
Qty: 2 ML | Refills: 2 | Status: SHIPPED | OUTPATIENT
Start: 2024-12-13

## 2024-12-13 RX ORDER — TIRZEPATIDE 5 MG/.5ML
5 INJECTION, SOLUTION SUBCUTANEOUS WEEKLY
Qty: 2 ML | Refills: 0 | OUTPATIENT
Start: 2024-12-13

## 2025-01-07 ASSESSMENT — PATIENT HEALTH QUESTIONNAIRE - PHQ9
2. FEELING DOWN, DEPRESSED OR HOPELESS: NOT AT ALL
SUM OF ALL RESPONSES TO PHQ QUESTIONS 1-9: 0
SUM OF ALL RESPONSES TO PHQ QUESTIONS 1-9: 0
SUM OF ALL RESPONSES TO PHQ9 QUESTIONS 1 & 2: 0
SUM OF ALL RESPONSES TO PHQ QUESTIONS 1-9: 0
1. LITTLE INTEREST OR PLEASURE IN DOING THINGS: NOT AT ALL
1. LITTLE INTEREST OR PLEASURE IN DOING THINGS: NOT AT ALL
SUM OF ALL RESPONSES TO PHQ9 QUESTIONS 1 & 2: 0
SUM OF ALL RESPONSES TO PHQ QUESTIONS 1-9: 0
2. FEELING DOWN, DEPRESSED OR HOPELESS: NOT AT ALL

## 2025-01-08 ENCOUNTER — OFFICE VISIT (OUTPATIENT)
Dept: FAMILY MEDICINE CLINIC | Age: 55
End: 2025-01-08
Payer: COMMERCIAL

## 2025-01-08 VITALS
SYSTOLIC BLOOD PRESSURE: 124 MMHG | HEIGHT: 66 IN | HEART RATE: 60 BPM | BODY MASS INDEX: 29.44 KG/M2 | RESPIRATION RATE: 18 BRPM | WEIGHT: 183.2 LBS | DIASTOLIC BLOOD PRESSURE: 76 MMHG

## 2025-01-08 DIAGNOSIS — G47.00 INSOMNIA, UNSPECIFIED TYPE: ICD-10-CM

## 2025-01-08 DIAGNOSIS — E66.811 OBESITY (BMI 30.0-34.9): ICD-10-CM

## 2025-01-08 DIAGNOSIS — E78.00 PURE HYPERCHOLESTEROLEMIA: ICD-10-CM

## 2025-01-08 DIAGNOSIS — E11.9 CONTROLLED TYPE 2 DIABETES MELLITUS WITHOUT COMPLICATION, WITHOUT LONG-TERM CURRENT USE OF INSULIN (HCC): Primary | ICD-10-CM

## 2025-01-08 PROCEDURE — G2211 COMPLEX E/M VISIT ADD ON: HCPCS | Performed by: FAMILY MEDICINE

## 2025-01-08 PROCEDURE — 99214 OFFICE O/P EST MOD 30 MIN: CPT | Performed by: FAMILY MEDICINE

## 2025-01-08 RX ORDER — TIRZEPATIDE 5 MG/.5ML
5 INJECTION, SOLUTION SUBCUTANEOUS WEEKLY
Qty: 2 ML | Refills: 5 | Status: SHIPPED | OUTPATIENT
Start: 2025-01-08

## 2025-01-08 SDOH — ECONOMIC STABILITY: FOOD INSECURITY: WITHIN THE PAST 12 MONTHS, YOU WORRIED THAT YOUR FOOD WOULD RUN OUT BEFORE YOU GOT MONEY TO BUY MORE.: NEVER TRUE

## 2025-01-08 SDOH — ECONOMIC STABILITY: FOOD INSECURITY: WITHIN THE PAST 12 MONTHS, THE FOOD YOU BOUGHT JUST DIDN'T LAST AND YOU DIDN'T HAVE MONEY TO GET MORE.: NEVER TRUE

## 2025-01-08 ASSESSMENT — ENCOUNTER SYMPTOMS
GASTROINTESTINAL NEGATIVE: 1
RESPIRATORY NEGATIVE: 1

## 2025-01-08 NOTE — PROGRESS NOTES
Selina Leon (:  1970) is a 54 y.o. female,Established patient, here for evaluation of the following chief complaint(s):  3 Month Follow-Up and Diabetes          Subjective   SUBJECTIVE/OBJECTIVE:  HPI  Chief Complaint   Patient presents with    3 Month Follow-Up    Diabetes     3 month eval.    Lab Results   Component Value Date    LABA1C 6.2 2024    LABA1C 6.8 (H) 10/05/2024    LABA1C 6.4 10/07/2023     Lab Results   Component Value Date    CREATININE 0.72 10/05/2024     Wt Readings from Last 3 Encounters:   25 83.1 kg (183 lb 3.2 oz)   10/10/24 85.7 kg (189 lb)   24 87.2 kg (192 lb 4.8 oz)     BP Readings from Last 3 Encounters:   25 124/76   10/10/24 110/62   24 114/70     Patient Active Problem List   Diagnosis    Diabetes mellitus due to underlying condition, controlled, without complication, without long-term current use of insulin ([facility])    Pure hypercholesterolemia    Irritable bowel syndrome with diarrhea    Obesity (BMI 30.0-34.9)       Current Outpatient Medications   Medication Sig Dispense Refill    Tirzepatide (MOUNJARO) 5 MG/0.5ML SOAJ Inject 5 mg into the skin once a week 2 mL 5    zolpidem (AMBIEN CR) 6.25 MG extended release tablet Take 1 tablet by mouth nightly as needed for Sleep for up to 180 days. Max Daily Amount: 6.25 mg 90 tablet 1    rosuvastatin (CRESTOR) 10 MG tablet TAKE 1 TABLET BY MOUTH EVERY DAY 90 tablet 3    metFORMIN (GLUCOPHAGE-XR) 500 MG extended release tablet TAKE 1 TABLET BY MOUTH WITH SUPPER 90 tablet 3     No current facility-administered medications for this visit.       Past Surgical History:   Procedure Laterality Date    COLONOSCOPY         Review of Systems   Constitutional: Negative.    HENT: Negative.     Respiratory: Negative.     Cardiovascular: Negative.    Gastrointestinal: Negative.    Musculoskeletal: Negative.    All other systems reviewed and are negative.         Objective   Physical Exam  Vitals and nursing note

## 2025-02-13 ENCOUNTER — TELEMEDICINE (OUTPATIENT)
Dept: FAMILY MEDICINE CLINIC | Age: 55
End: 2025-02-13

## 2025-02-13 DIAGNOSIS — J11.1 INFLUENZA-LIKE ILLNESS: Primary | ICD-10-CM

## 2025-02-13 RX ORDER — HYDROCODONE POLISTIREX AND CHLORPHENIRAMINE POLISTIREX 10; 8 MG/5ML; MG/5ML
5 SUSPENSION, EXTENDED RELEASE ORAL EVERY 12 HOURS PRN
Qty: 60 ML | Refills: 0 | Status: SHIPPED | OUTPATIENT
Start: 2025-02-13 | End: 2025-02-19

## 2025-02-13 ASSESSMENT — ENCOUNTER SYMPTOMS
GASTROINTESTINAL NEGATIVE: 1
SHORTNESS OF BREATH: 0
COUGH: 1
WHEEZING: 0
SINUS PRESSURE: 1
CHEST TIGHTNESS: 1

## 2025-02-13 NOTE — PROGRESS NOTES
Selina Leon (:  1970) is a Established patient, here for evaluation of the following:    Subjective   HPI:    Chief Complaint   Patient presents with    Congestion    Cough    Fever       Pt presents today with c/o sinus pressure, congestion, body aches for the last 2 days.  + exposure to influenza A.  Tmax 102.    Some chest tightness.  Denies SOB.      +HA, myalgias.     No flu shot.    Patient Active Problem List   Diagnosis    Diabetes mellitus due to underlying condition, controlled, without complication, without long-term current use of insulin (HCC)    Pure hypercholesterolemia    Irritable bowel syndrome with diarrhea    Obesity (BMI 30.0-34.9)     Past Surgical History:   Procedure Laterality Date    COLONOSCOPY       Social History     Tobacco Use    Smoking status: Never     Passive exposure: Never    Smokeless tobacco: Never   Substance Use Topics    Alcohol use: No     Alcohol/week: 0.0 standard drinks of alcohol    Drug use: No       Review of Systems   Constitutional:  Positive for chills, fatigue and fever.   HENT:  Positive for congestion and sinus pressure.    Respiratory:  Positive for cough and chest tightness. Negative for shortness of breath and wheezing.    Cardiovascular: Negative.    Gastrointestinal: Negative.    Musculoskeletal:  Positive for myalgias.   Neurological:  Positive for headaches.   All other systems reviewed and are negative.         Objective   Patient-Reported Vitals  No data recorded     Physical Exam  Constitutional:       General: She is not in acute distress.     Appearance: Normal appearance. She is well-developed. She is ill-appearing. She is not toxic-appearing.   HENT:      Head: Normocephalic and atraumatic.      Right Ear: External ear normal.      Left Ear: External ear normal.   Eyes:      Conjunctiva/sclera: Conjunctivae normal.   Pulmonary:      Effort: Pulmonary effort is normal. No respiratory distress.   Skin:     Findings: No rash (on exposed

## 2025-03-19 ENCOUNTER — HOSPITAL ENCOUNTER (OUTPATIENT)
Dept: WOMENS IMAGING | Age: 55
Discharge: HOME | End: 2025-03-19
Payer: COMMERCIAL

## 2025-03-19 VITALS — HEIGHT: 63 IN | BODY MASS INDEX: 30.83 KG/M2 | WEIGHT: 174 LBS

## 2025-03-19 DIAGNOSIS — Z12.31 VISIT FOR SCREENING MAMMOGRAM: ICD-10-CM

## 2025-03-19 PROCEDURE — 77063 BREAST TOMOSYNTHESIS BI: CPT

## 2025-05-23 RX ORDER — ROSUVASTATIN CALCIUM 10 MG/1
10 TABLET, COATED ORAL DAILY
Qty: 90 TABLET | Refills: 3 | Status: SHIPPED | OUTPATIENT
Start: 2025-05-23

## 2025-05-23 RX ORDER — METFORMIN HYDROCHLORIDE 500 MG/1
TABLET, EXTENDED RELEASE ORAL
Qty: 90 TABLET | Refills: 3 | Status: SHIPPED | OUTPATIENT
Start: 2025-05-23

## 2025-06-04 DIAGNOSIS — E11.9 CONTROLLED TYPE 2 DIABETES MELLITUS WITHOUT COMPLICATION, WITHOUT LONG-TERM CURRENT USE OF INSULIN (HCC): ICD-10-CM

## 2025-06-04 RX ORDER — TIRZEPATIDE 5 MG/.5ML
5 INJECTION, SOLUTION SUBCUTANEOUS WEEKLY
Qty: 2 ML | Refills: 5 | Status: SHIPPED | OUTPATIENT
Start: 2025-06-04

## 2025-06-07 DIAGNOSIS — G47.00 INSOMNIA, UNSPECIFIED TYPE: ICD-10-CM

## 2025-06-09 RX ORDER — ZOLPIDEM TARTRATE 6.25 MG/1
6.25 TABLET, FILM COATED, EXTENDED RELEASE ORAL NIGHTLY PRN
Qty: 90 TABLET | Refills: 1 | Status: SHIPPED | OUTPATIENT
Start: 2025-06-09 | End: 2025-12-06

## 2025-06-11 ENCOUNTER — OFFICE VISIT (OUTPATIENT)
Dept: FAMILY MEDICINE CLINIC | Age: 55
End: 2025-06-11
Payer: COMMERCIAL

## 2025-06-11 VITALS
WEIGHT: 169.1 LBS | RESPIRATION RATE: 16 BRPM | HEART RATE: 64 BPM | BODY MASS INDEX: 29.95 KG/M2 | SYSTOLIC BLOOD PRESSURE: 122 MMHG | DIASTOLIC BLOOD PRESSURE: 68 MMHG

## 2025-06-11 DIAGNOSIS — K59.00 CONSTIPATION, UNSPECIFIED CONSTIPATION TYPE: Primary | ICD-10-CM

## 2025-06-11 DIAGNOSIS — E11.9 CONTROLLED TYPE 2 DIABETES MELLITUS WITHOUT COMPLICATION, WITHOUT LONG-TERM CURRENT USE OF INSULIN (HCC): ICD-10-CM

## 2025-06-11 DIAGNOSIS — G47.00 INSOMNIA, UNSPECIFIED TYPE: ICD-10-CM

## 2025-06-11 DIAGNOSIS — E66.811 OBESITY (BMI 30.0-34.9): ICD-10-CM

## 2025-06-11 PROCEDURE — 99214 OFFICE O/P EST MOD 30 MIN: CPT | Performed by: FAMILY MEDICINE

## 2025-06-11 ASSESSMENT — ENCOUNTER SYMPTOMS
CONSTIPATION: 1
RESPIRATORY NEGATIVE: 1

## 2025-06-11 NOTE — PROGRESS NOTES
now, pt way want to drop to 2.5 mg in future  -  Continue daily Miralax and Colace, stressed water intake  -  Ambien could also be contributing, wishes to continue for now  -  A1C next month    Return if symptoms worsen or fail to improve.             An electronic signature was used to authenticate this note.    --Merrill Larsen, DO

## 2025-08-25 DIAGNOSIS — G47.00 INSOMNIA, UNSPECIFIED TYPE: ICD-10-CM

## 2025-08-26 RX ORDER — ZOLPIDEM TARTRATE 6.25 MG/1
6.25 TABLET, FILM COATED, EXTENDED RELEASE ORAL NIGHTLY PRN
Qty: 90 TABLET | Refills: 1 | Status: SHIPPED | OUTPATIENT
Start: 2025-08-26 | End: 2026-02-22

## 2025-08-27 DIAGNOSIS — E11.9 CONTROLLED TYPE 2 DIABETES MELLITUS WITHOUT COMPLICATION, WITHOUT LONG-TERM CURRENT USE OF INSULIN (HCC): Primary | ICD-10-CM

## 2025-08-27 RX ORDER — TIRZEPATIDE 10 MG/.5ML
10 INJECTION, SOLUTION SUBCUTANEOUS WEEKLY
Qty: 2 ML | Refills: 5 | Status: SHIPPED | OUTPATIENT
Start: 2025-08-27